# Patient Record
Sex: MALE | Race: WHITE
[De-identification: names, ages, dates, MRNs, and addresses within clinical notes are randomized per-mention and may not be internally consistent; named-entity substitution may affect disease eponyms.]

---

## 2022-11-23 ENCOUNTER — HOSPITAL ENCOUNTER (OUTPATIENT)
Dept: HOSPITAL 76 - SDS | Age: 77
Discharge: HOME | End: 2022-11-23
Attending: SURGERY
Payer: MEDICARE

## 2022-11-23 VITALS — DIASTOLIC BLOOD PRESSURE: 78 MMHG | SYSTOLIC BLOOD PRESSURE: 125 MMHG

## 2022-11-23 DIAGNOSIS — R19.5: Primary | ICD-10-CM

## 2022-11-23 DIAGNOSIS — K63.5: ICD-10-CM

## 2022-11-23 DIAGNOSIS — Z87.891: ICD-10-CM

## 2022-11-23 DIAGNOSIS — K57.30: ICD-10-CM

## 2022-11-23 DIAGNOSIS — I10: ICD-10-CM

## 2022-11-23 DIAGNOSIS — K64.8: ICD-10-CM

## 2022-11-23 PROCEDURE — 45380 COLONOSCOPY AND BIOPSY: CPT

## 2022-11-23 PROCEDURE — 0DBN8ZX EXCISION OF SIGMOID COLON, VIA NATURAL OR ARTIFICIAL OPENING ENDOSCOPIC, DIAGNOSTIC: ICD-10-PCS | Performed by: SURGERY

## 2022-11-23 NOTE — ANESTHESIA POST OP EVALUATION
Anesthesia Post Eval





- Post Anesthesia Eval


Vitals: 





                                Last Vital Signs











Temp  36.5 C   11/23/22 12:30


 


Pulse  81   11/23/22 12:30


 


Resp  17   11/23/22 12:30


 


BP  125/78   11/23/22 12:30


 


Pulse Ox  100   11/23/22 12:30


 


O2 Flow Rate      











CV Function Including HR & BP: Stable


Pain Control: Satisfactory


Nausea & Vomiting: Negative


Mental Status: Baseline


Respiratory Status: Airway Patent


Hydration Status: Satisfactory


Anesthesia Complications: None

## 2022-11-23 NOTE — ANESTHESIA
Pre-Anesthesia VS, & Labs





- Diagnosis





pos cologuard





- Procedure





colonoscopy


Vital Signs: 





                                        











Temp Pulse Resp BP Pulse Ox O2 Flow Rate


 


 36.4 C L  84   18   139/84 H  100    


 


 11/23/22 09:47  11/23/22 09:47  11/23/22 09:47  11/23/22 09:47  11/23/22 09:47 

 











Height: 6 ft 2 in


Weight (kg): 87 kg


Body Mass Index: 24.6


BMI Classification: Normal





- NPO


>8 hours





Home Medications and Allergies


Home Medications: 


Ambulatory Orders





Atorvastatin Calcium 40 mg PO DAILY 11/22/22 


Levothyroxine [Synthroid] 100 mcg PO QDAC 11/22/22 


Losartan Potassium 25 mg PO DAILY 11/22/22 


Metoprolol Tartrate [Lopressor] 25 mg PO DAILY 11/22/22 











                                        





Atorvastatin Calcium 40 mg PO DAILY 11/22/22 


Levothyroxine [Synthroid] 100 mcg PO QDAC 11/22/22 


Losartan Potassium 25 mg PO DAILY 11/22/22 


Metoprolol Tartrate [Lopressor] 25 mg PO DAILY 11/22/22 








Allergies/Adverse Reactions: 


                                    Allergies











Allergy/AdvReac Type Severity Reaction Status Date / Time


 


No Known Drug Allergies Allergy   Verified 11/22/22 12:38














Anes History & Medical History





- Anesthetic History


Anesthesia Complications: reports: No previous complications


Family history of Anesthesia Complications: Denies


Family history of Malignant Hyperthermia: Denies





- Medical History


Cardiovascular: reports: Hypertension, High cholesterol


Pulmonary: reports: None


Gastrointestinal: reports: None


Urinary: reports: None


Musculoskeletal: reports: None


Endocrine/Autoimmune: reports: HyPOthyroidism


Skin: reports: None





- Surgical History


General: reports: Colonoscopy


Eyes Ears Nose Throat (EENT): reports: Cataracts





Exam


General: Alert, Oriented x3, Cooperative


Dental: WNL


Mouth Opening: 3 Fingerbreadth


Neck Mobility: Normal


Mallampati classification: I


Thyromental Distance: 4-6 cm


Respiratory: Lungs clear


Cardiovascular: Regular rate





Plan


Anesthesia Type: Total IV


Consent for Procedure(s) Verified and Reviewed: Yes


Code Status: Attempt Resuscitation


ASA classification: 2-Mild systemic disease


Is this case an emergency?: No